# Patient Record
Sex: FEMALE | URBAN - METROPOLITAN AREA
[De-identification: names, ages, dates, MRNs, and addresses within clinical notes are randomized per-mention and may not be internally consistent; named-entity substitution may affect disease eponyms.]

---

## 2018-07-27 ENCOUNTER — APPOINTMENT (RX ONLY)
Dept: URBAN - METROPOLITAN AREA CLINIC 53 | Facility: CLINIC | Age: 68
Setting detail: DERMATOLOGY
End: 2018-07-27

## 2018-07-27 DIAGNOSIS — B07.8 OTHER VIRAL WARTS: ICD-10-CM

## 2018-07-27 PROBLEM — I10 ESSENTIAL (PRIMARY) HYPERTENSION: Status: ACTIVE | Noted: 2018-07-27

## 2018-07-27 PROBLEM — H54.7 UNSPECIFIED VISUAL LOSS: Status: ACTIVE | Noted: 2018-07-27

## 2018-07-27 PROBLEM — D48.5 NEOPLASM OF UNCERTAIN BEHAVIOR OF SKIN: Status: ACTIVE | Noted: 2018-07-27

## 2018-07-27 PROBLEM — N18.9 CHRONIC KIDNEY DISEASE, UNSPECIFIED: Status: ACTIVE | Noted: 2018-07-27

## 2018-07-27 PROCEDURE — 11100: CPT

## 2018-07-27 PROCEDURE — ? BIOPSY BY SHAVE METHOD

## 2018-07-27 ASSESSMENT — LOCATION ZONE DERM: LOCATION ZONE: FEET

## 2018-07-27 ASSESSMENT — LOCATION DETAILED DESCRIPTION DERM: LOCATION DETAILED: LEFT DORSAL FOOT

## 2018-07-27 ASSESSMENT — LOCATION SIMPLE DESCRIPTION DERM: LOCATION SIMPLE: LEFT FOOT

## 2019-04-01 ENCOUNTER — APPOINTMENT (RX ONLY)
Dept: URBAN - METROPOLITAN AREA CLINIC 53 | Facility: CLINIC | Age: 69
Setting detail: DERMATOLOGY
End: 2019-04-01

## 2019-04-01 DIAGNOSIS — L82.0 INFLAMED SEBORRHEIC KERATOSIS: ICD-10-CM

## 2019-04-01 DIAGNOSIS — L81.4 OTHER MELANIN HYPERPIGMENTATION: ICD-10-CM

## 2019-04-01 DIAGNOSIS — D18.0 HEMANGIOMA: ICD-10-CM

## 2019-04-01 DIAGNOSIS — D22 MELANOCYTIC NEVI: ICD-10-CM

## 2019-04-01 DIAGNOSIS — D69.2 OTHER NONTHROMBOCYTOPENIC PURPURA: ICD-10-CM

## 2019-04-01 DIAGNOSIS — L82.1 OTHER SEBORRHEIC KERATOSIS: ICD-10-CM

## 2019-04-01 PROBLEM — D22.5 MELANOCYTIC NEVI OF TRUNK: Status: ACTIVE | Noted: 2019-04-01

## 2019-04-01 PROBLEM — D18.01 HEMANGIOMA OF SKIN AND SUBCUTANEOUS TISSUE: Status: ACTIVE | Noted: 2019-04-01

## 2019-04-01 PROBLEM — D23.9 OTHER BENIGN NEOPLASM OF SKIN, UNSPECIFIED: Status: ACTIVE | Noted: 2019-04-01

## 2019-04-01 PROCEDURE — ? COUNSELING

## 2019-04-01 PROCEDURE — ? LIQUID NITROGEN

## 2019-04-01 PROCEDURE — 17110 DESTRUCTION B9 LES UP TO 14: CPT

## 2019-04-01 PROCEDURE — 99213 OFFICE O/P EST LOW 20 MIN: CPT | Mod: 25

## 2019-04-01 PROCEDURE — ? INVENTORY

## 2019-04-01 ASSESSMENT — LOCATION SIMPLE DESCRIPTION DERM
LOCATION SIMPLE: RIGHT FOREARM
LOCATION SIMPLE: ABDOMEN
LOCATION SIMPLE: GROIN
LOCATION SIMPLE: CHEST
LOCATION SIMPLE: UPPER BACK
LOCATION SIMPLE: LEFT FOREARM
LOCATION SIMPLE: LEFT UPPER BACK

## 2019-04-01 ASSESSMENT — LOCATION DETAILED DESCRIPTION DERM
LOCATION DETAILED: LEFT LATERAL SUPERIOR CHEST
LOCATION DETAILED: INFERIOR THORACIC SPINE
LOCATION DETAILED: LEFT PROXIMAL DORSAL FOREARM
LOCATION DETAILED: LEFT MEDIAL SUPERIOR CHEST
LOCATION DETAILED: LEFT LATERAL ABDOMEN
LOCATION DETAILED: RIGHT PROXIMAL DORSAL FOREARM
LOCATION DETAILED: MONS PUBIS
LOCATION DETAILED: LEFT SUPERIOR UPPER BACK

## 2019-04-01 ASSESSMENT — LOCATION ZONE DERM
LOCATION ZONE: ARM
LOCATION ZONE: TRUNK
LOCATION ZONE: VULVA

## 2019-06-20 ENCOUNTER — APPOINTMENT (RX ONLY)
Dept: URBAN - METROPOLITAN AREA CLINIC 53 | Facility: CLINIC | Age: 69
Setting detail: DERMATOLOGY
End: 2019-06-20

## 2019-06-20 DIAGNOSIS — S31000A OPEN WOUND(S) (MULTIPLE) OF UNSPECIFIED SITE(S), WITHOUT MENTION OF COMPLICATION: ICD-10-CM

## 2019-06-20 DIAGNOSIS — R23.3 SPONTANEOUS ECCHYMOSES: ICD-10-CM

## 2019-06-20 PROBLEM — S51.812A LACERATION WITHOUT FOREIGN BODY OF LEFT FOREARM, INITIAL ENCOUNTER: Status: ACTIVE | Noted: 2019-06-20

## 2019-06-20 PROCEDURE — ? ADDITIONAL NOTES

## 2019-06-20 PROCEDURE — 99213 OFFICE O/P EST LOW 20 MIN: CPT

## 2019-06-20 PROCEDURE — ? COUNSELING

## 2019-06-20 ASSESSMENT — LOCATION ZONE DERM: LOCATION ZONE: ARM

## 2019-06-20 ASSESSMENT — LOCATION SIMPLE DESCRIPTION DERM
LOCATION SIMPLE: LEFT FOREARM
LOCATION SIMPLE: RIGHT FOREARM

## 2019-06-20 ASSESSMENT — LOCATION DETAILED DESCRIPTION DERM
LOCATION DETAILED: LEFT PROXIMAL DORSAL FOREARM
LOCATION DETAILED: RIGHT PROXIMAL DORSAL FOREARM

## 2019-06-20 NOTE — HPI: LACERATION
How Severe Is It?: moderate
How Is Your Laceration Healing?: healing slowly
Is This A New Presentation, Or A Follow-Up?: Laceration